# Patient Record
Sex: MALE | Race: WHITE
[De-identification: names, ages, dates, MRNs, and addresses within clinical notes are randomized per-mention and may not be internally consistent; named-entity substitution may affect disease eponyms.]

---
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
## 2021-03-16 NOTE — HISTORY OF PRESENT ILLNESS
[de-identified] : here for second bexsero feeling well  [FreeTextEntry6] : No current complaints\par No fever, No cough, No ear pain, No nasal congestion\par No wheezing\par Normal appetite, No vomiting, No diarrhea\par No reactions to previous vaccines\par No egg allergies\par No immunocompromised contacts\par  detailed exam

## 2021-08-10 ENCOUNTER — APPOINTMENT (OUTPATIENT)
Dept: PEDIATRICS | Facility: CLINIC | Age: 21
End: 2021-08-10

## 2025-01-25 ENCOUNTER — NON-APPOINTMENT (OUTPATIENT)
Age: 25
End: 2025-01-25